# Patient Record
Sex: FEMALE | Race: WHITE | Employment: FULL TIME | URBAN - METROPOLITAN AREA
[De-identification: names, ages, dates, MRNs, and addresses within clinical notes are randomized per-mention and may not be internally consistent; named-entity substitution may affect disease eponyms.]

---

## 2017-04-04 ENCOUNTER — TELEPHONE (OUTPATIENT)
Dept: INTERNAL MEDICINE CLINIC | Facility: CLINIC | Age: 42
End: 2017-04-04

## 2017-04-04 NOTE — TELEPHONE ENCOUNTER
Spoke with pt, no samples available. Advised she can search online for possible coupon from drug company, change medication, or change buproprion from from extended release to original or consider different medication.  Pt does not want to make changes at t

## 2017-04-04 NOTE — TELEPHONE ENCOUNTER
Patient stated that her 's insurance cancelled her for some reason or another. She is wondering if she can get samples from us until they figure out what to do.   She needs BuPROPion HCl ER, XL, 300 MG Oral Tablet 24 Hr and Levonorgest-Eth Estrad 91

## 2017-05-16 RX ORDER — BUPROPION HYDROCHLORIDE 300 MG/1
TABLET ORAL
Qty: 30 TABLET | Refills: 1 | Status: SHIPPED | OUTPATIENT
Start: 2017-05-16 | End: 2017-05-23

## 2017-05-16 NOTE — TELEPHONE ENCOUNTER
Last OV pertinent to medication: 11/11/2016  Last refill date: 11/11/2016     #/refills: 30/5  When pt was asked to return for OV: none noted  Upcoming appt/reason: 5/23/2017 - Med check

## 2017-05-23 ENCOUNTER — OFFICE VISIT (OUTPATIENT)
Dept: INTERNAL MEDICINE CLINIC | Facility: CLINIC | Age: 42
End: 2017-05-23

## 2017-05-23 VITALS
DIASTOLIC BLOOD PRESSURE: 80 MMHG | BODY MASS INDEX: 25.49 KG/M2 | SYSTOLIC BLOOD PRESSURE: 112 MMHG | HEIGHT: 65 IN | WEIGHT: 153 LBS | TEMPERATURE: 98 F | HEART RATE: 85 BPM | RESPIRATION RATE: 16 BRPM | OXYGEN SATURATION: 98 %

## 2017-05-23 DIAGNOSIS — F32.A CONTROLLED DEPRESSION: Primary | ICD-10-CM

## 2017-05-23 PROCEDURE — 99213 OFFICE O/P EST LOW 20 MIN: CPT | Performed by: INTERNAL MEDICINE

## 2017-05-23 RX ORDER — GARLIC EXTRACT 500 MG
1 CAPSULE ORAL DAILY
COMMUNITY

## 2017-05-23 RX ORDER — DOXEPIN HYDROCHLORIDE 50 MG/1
1 CAPSULE ORAL DAILY
COMMUNITY

## 2017-05-23 RX ORDER — BUPROPION HYDROCHLORIDE 300 MG/1
300 TABLET ORAL
Qty: 90 TABLET | Refills: 1 | Status: SHIPPED | OUTPATIENT
Start: 2017-05-23 | End: 2017-12-08

## 2017-05-23 NOTE — PROGRESS NOTES
Keyana River is a 46year old female. To F/U from last visit regarding medication for mood   HPI:    Interim history:her headaches are gone, she lost 14 lb, great diet, feels great  welbutrin working well, never wants to come off.  Sleeping fine     Curren indicates understanding of these issues and agrees to the plan.

## 2017-10-26 ENCOUNTER — TELEPHONE (OUTPATIENT)
Dept: OBGYN CLINIC | Facility: CLINIC | Age: 42
End: 2017-10-26

## 2017-10-26 NOTE — TELEPHONE ENCOUNTER
Pt has requested records to be sent to Dr Michael Colunga; pap,colpo,IUD  Insertion/removal from any years of treatment   [facility] , [address], [place] [address]  Sent to scan October 26,2017

## 2017-12-08 RX ORDER — BUPROPION HYDROCHLORIDE 300 MG/1
300 TABLET ORAL
Qty: 90 TABLET | Refills: 1 | Status: SHIPPED | OUTPATIENT
Start: 2017-12-08 | End: 2018-06-13

## 2017-12-08 NOTE — TELEPHONE ENCOUNTER
Last OV relevant to medication: 5/23/2017  Last refill date: 5/23/2017     #/refills: 90/1  When pt was asked to return for OV: none noted  Upcoming appt/reason: none

## 2018-06-14 RX ORDER — BUPROPION HYDROCHLORIDE 300 MG/1
300 TABLET ORAL
Qty: 90 TABLET | Refills: 0 | Status: SHIPPED | OUTPATIENT
Start: 2018-06-14 | End: 2018-07-26

## 2018-06-14 NOTE — TELEPHONE ENCOUNTER
Patient called back regarding Medication follow up appointment. Patient scheduled med check appointment for 07-26-18. Patient wanted to verify if she is able to get a refill on her prescription before her appointment. Please advise. Thank you.

## 2018-06-14 NOTE — TELEPHONE ENCOUNTER
Called patient, Left Message and verbalized that a Medication Follow Up Appointment is needed in the system first before we can Fully Refill the prescription requested, and that a Temporary Refill can be sent to their local pharmacy if patient will run out

## 2018-07-26 ENCOUNTER — OFFICE VISIT (OUTPATIENT)
Dept: INTERNAL MEDICINE CLINIC | Facility: CLINIC | Age: 43
End: 2018-07-26
Payer: COMMERCIAL

## 2018-07-26 VITALS
HEART RATE: 71 BPM | WEIGHT: 154 LBS | TEMPERATURE: 98 F | OXYGEN SATURATION: 99 % | SYSTOLIC BLOOD PRESSURE: 108 MMHG | DIASTOLIC BLOOD PRESSURE: 72 MMHG | RESPIRATION RATE: 14 BRPM | HEIGHT: 65 IN | BODY MASS INDEX: 25.66 KG/M2

## 2018-07-26 DIAGNOSIS — F32.A CONTROLLED DEPRESSION: Primary | ICD-10-CM

## 2018-07-26 DIAGNOSIS — E78.00 HIGH CHOLESTEROL: ICD-10-CM

## 2018-07-26 PROCEDURE — 99213 OFFICE O/P EST LOW 20 MIN: CPT | Performed by: INTERNAL MEDICINE

## 2018-07-26 RX ORDER — BUPROPION HYDROCHLORIDE 300 MG/1
300 TABLET ORAL
Qty: 90 TABLET | Refills: 1 | Status: SHIPPED | OUTPATIENT
Start: 2018-07-26 | End: 2019-02-28

## 2018-07-26 NOTE — PROGRESS NOTES
Daxa Zaidi is a 38year old female.  To F/U from last visit regarding bupropion   HPI:    Interim history:she is doing well on medications  She is sleeping oK, takes benadryl now and then  She does some exercisek, walks 5 miles 3 days weekly  Appetite no Metabolic Panel (8)    Meds & Refills for this Visit:  Signed Prescriptions Disp Refills    BuPROPion HCl ER, XL, 300 MG Oral Tablet 24 Hr 90 tablet 1      Sig: Take 1 tablet (300 mg total) by mouth once daily.            Imaging & Consults:  None    No Fol

## 2018-10-15 ENCOUNTER — TELEPHONE (OUTPATIENT)
Dept: INTERNAL MEDICINE CLINIC | Facility: CLINIC | Age: 43
End: 2018-10-15

## 2018-10-15 RX ORDER — ESCITALOPRAM OXALATE 10 MG/1
10 TABLET ORAL DAILY
Qty: 30 TABLET | Refills: 0 | Status: CANCELLED | OUTPATIENT
Start: 2018-10-15

## 2018-10-15 NOTE — TELEPHONE ENCOUNTER
Patient calling in c/o of increase in anxiety and depression, feels her medication may need to be adjusted. Patient has see Dr. Yanick Altamirano for these issues multiple times before.  Patient states she has some increased stressors in her life (full time job, kids,

## 2018-10-15 NOTE — TELEPHONE ENCOUNTER
Pt would like appt with Dr Yanick Altamirano for depression and anxiety, would like to be seen as soon as possible. I am not sure if this should be a 40 min appt, Dr Yanick Altamirano does not have 40 min avail until Nov 2.  Pt  would prefer to See Dr Yanick Altamirano rather than any other p

## 2018-10-15 NOTE — TELEPHONE ENCOUNTER
Spoke to patient. Patient would like to wait to make any changes or be referred anywhere until speaking with Dr. Andre Taveras on Thursday. She is open to both suggestions though. Routed FYI to Dr. Andre Taveras. OK to close.

## 2018-10-15 NOTE — TELEPHONE ENCOUNTER
Resume lexapro 10 mg daily in the meantime  #30  And [facility] as well thanks

## 2018-10-18 PROBLEM — F41.9 ANXIETY AND DEPRESSION: Status: ACTIVE | Noted: 2018-10-18

## 2018-10-18 PROBLEM — F32.A ANXIETY AND DEPRESSION: Status: ACTIVE | Noted: 2018-10-18

## 2018-10-18 NOTE — PROGRESS NOTES
Mckenna Arias is a 38year old female  Patient presents with:   Anxiety  Depression      HPI:   Pt called a couple of days ago and related the following:  Patient calling in c/o of increase in anxiety and depression, feels her medication may need to be adju mouth daily. Disp: 1 Package Rfl: 4   multivitamin Oral Tab Take 1 tablet by mouth daily. Disp:  Rfl:    Acidophilus/Pectin Oral Cap Take 1 capsule by mouth daily.  Disp:  Rfl:    ibuprofen (MOTRIN) 600 MG Oral Tab Take 600 mg by mouth every 6 (six) hours a

## 2018-11-15 RX ORDER — ESCITALOPRAM OXALATE 10 MG/1
TABLET ORAL
Qty: 30 TABLET | Refills: 0 | Status: SHIPPED | OUTPATIENT
Start: 2018-11-15 | End: 2018-11-27

## 2018-11-15 NOTE — TELEPHONE ENCOUNTER
Last OV relevant to medication: 10/18/18  Last refill date: 10/18/18     #/refills: 30/0  When pt was asked to return for OV: 4 wks  Upcoming appt/reason: 11/27/18 anxiety f/u

## 2018-11-27 NOTE — PROGRESS NOTES
Carolyn Abdi is a 38year old female.  To F/U from last visit regarding anxiety and depression  HPI:    Interim history:taking ativan every evening for nerves, it helps her relax a lot in the evening  She feels better, less crying, less annoyed and irritab depression  (primary encounter diagnosis) and lots of stress, CPM, she is using ativan nightly now, cautioned against accelerated use and abuse, 1 daily is fine    No orders of the defined types were placed in this encounter.       Meds & Refills for this V

## 2018-12-17 RX ORDER — ESCITALOPRAM OXALATE 10 MG/1
TABLET ORAL
Qty: 30 TABLET | Refills: 0 | OUTPATIENT
Start: 2018-12-17

## 2019-02-28 ENCOUNTER — TELEPHONE (OUTPATIENT)
Dept: INTERNAL MEDICINE CLINIC | Facility: CLINIC | Age: 44
End: 2019-02-28

## 2019-02-28 RX ORDER — BUPROPION HYDROCHLORIDE 450 MG/1
1 TABLET, FILM COATED, EXTENDED RELEASE ORAL DAILY
Qty: 90 TABLET | Refills: 2 | OUTPATIENT
Start: 2019-02-28 | End: 2019-04-18

## 2019-02-28 NOTE — TELEPHONE ENCOUNTER
RX sent with sig 1 mg by mouth daily. Verified that it's one tablet by mouth daily. Med list updated. Also d/c'd 300 mg tablet.

## 2019-02-28 NOTE — TELEPHONE ENCOUNTER
[facility]/PHARMACY #[id number] Myke Garibay AT [facility], [address], [phone] following up regarding medication sent in today: Bupropion requesting clarification on directions. Nurse line busy. Please advise. Thank you!

## 2019-02-28 NOTE — PROGRESS NOTES
Keyana River is a 41year old female. To F/U from last visit regarding mood  HPI:    Interim history:she gained 14 lb since lexapro and is very sluggish and tired.  She has lost sexual function as well  Her mood is so much better though    Current Outpatie necessary  Increase welbutrin to 450 xl, no hx of sx's, f/u 3 mos  She still sees her therapist    No orders of the defined types were placed in this encounter.       Meds & Refills for this Visit:  Requested Prescriptions     Signed Prescriptions Disp Refi

## 2019-03-06 PROCEDURE — 87624 HPV HI-RISK TYP POOLED RSLT: CPT | Performed by: OBSTETRICS & GYNECOLOGY

## 2019-03-06 PROCEDURE — 88175 CYTOPATH C/V AUTO FLUID REDO: CPT | Performed by: OBSTETRICS & GYNECOLOGY

## 2019-03-15 RX ORDER — ESCITALOPRAM OXALATE 10 MG/1
TABLET ORAL
Qty: 90 TABLET | Refills: 0 | OUTPATIENT
Start: 2019-03-15

## 2019-04-08 RX ORDER — BUPROPION HYDROCHLORIDE 300 MG/1
TABLET ORAL
Qty: 90 TABLET | Refills: 1 | OUTPATIENT
Start: 2019-04-08

## 2019-04-15 ENCOUNTER — TELEPHONE (OUTPATIENT)
Dept: INTERNAL MEDICINE CLINIC | Facility: CLINIC | Age: 44
End: 2019-04-15

## 2019-04-15 NOTE — TELEPHONE ENCOUNTER
Patient calling to state that the change to Wellbutrin 450 mg is too much for her. Patients Wellbutrin increased from 300mg to 450mg 2/28/19, at that time she also d/c Lexapro per Dr. Mitzy Michel recommendation.  Patient states that she \"can't get happy\", she

## 2019-04-15 NOTE — TELEPHONE ENCOUNTER
Patient calling regarding Wellbutrin medication she is taking stated she does not like the side effects and would like to switch medications. Pt requesting a call back today. Please advise. Thank you!

## 2019-04-18 ENCOUNTER — OFFICE VISIT (OUTPATIENT)
Dept: INTERNAL MEDICINE CLINIC | Facility: CLINIC | Age: 44
End: 2019-04-18
Payer: COMMERCIAL

## 2019-04-18 VITALS
HEIGHT: 65 IN | RESPIRATION RATE: 14 BRPM | DIASTOLIC BLOOD PRESSURE: 66 MMHG | WEIGHT: 163 LBS | TEMPERATURE: 98 F | HEART RATE: 71 BPM | SYSTOLIC BLOOD PRESSURE: 118 MMHG | BODY MASS INDEX: 27.16 KG/M2 | OXYGEN SATURATION: 98 %

## 2019-04-18 DIAGNOSIS — F32.A ANXIETY AND DEPRESSION: ICD-10-CM

## 2019-04-18 DIAGNOSIS — F41.9 ANXIETY AND DEPRESSION: ICD-10-CM

## 2019-04-18 DIAGNOSIS — T43.205A ADVERSE EFFECT OF ANTIDEPRESSANT DRUG, INITIAL ENCOUNTER: Primary | ICD-10-CM

## 2019-04-18 PROCEDURE — 99213 OFFICE O/P EST LOW 20 MIN: CPT | Performed by: INTERNAL MEDICINE

## 2019-04-18 RX ORDER — CLONAZEPAM 0.5 MG/1
0.5 TABLET ORAL 2 TIMES DAILY PRN
Qty: 30 TABLET | Refills: 0 | Status: SHIPPED | OUTPATIENT
Start: 2019-04-18 | End: 2019-05-03

## 2019-04-18 RX ORDER — BUPROPION HYDROCHLORIDE 300 MG/1
300 TABLET ORAL
Qty: 30 TABLET | Refills: 1 | Status: SHIPPED | OUTPATIENT
Start: 2019-04-18

## 2019-04-18 NOTE — PROGRESS NOTES
Coral Burnett is a 41year old female. To F/U from last visit regarding depression/anxiety  HPI:     Interim history:she is absolutely miserable w/anxiety and irritability, on edge, on 450 mg welbutrin.  We had increased and d/c'd lexapro  Due to loss of se PLAN:   Adverse effect of antidepressant drug, initial encounter  (primary encounter diagnosis) lower welbutrin back down to 300 XL daily, she has tolerated well before, albeit in conjunction w/SSRI.  I suspect she may still need such a drug but we can see

## 2019-05-03 NOTE — PROGRESS NOTES
Daxa Zaidi is a 41year old female.  To F/U from last visit regarding anxiety  HPI:    Interim history:we had stopped lexapro and increased welbutrin and became terribly dysphoric and anxious, 2 weeks ago we lowered her welbutrin back down to the 300 tayler diagnosis) she is using benzos now and still doesn't feel well  She had done well on this dose of welbutrin in the past but is not feeling herself.  She has had enormous stress-  w//rectal ca, son w/depression- will CPM and refer to [facility]    No order

## 2019-05-06 ENCOUNTER — TELEPHONE (OUTPATIENT)
Dept: INTERNAL MEDICINE CLINIC | Facility: CLINIC | Age: 44
End: 2019-05-06

## 2019-05-06 NOTE — TELEPHONE ENCOUNTER
Hi Dr. Rocio Soni,     I was able to reach and speak with Mary Siegel this morning. I think she would definitely benefit from both outpatient therapy and psychiatry, she was only agreeable to psychiatry for medication management.  Below are the resources I ema

## 2019-05-29 ENCOUNTER — TELEPHONE (OUTPATIENT)
Dept: INTERNAL MEDICINE CLINIC | Facility: CLINIC | Age: 44
End: 2019-05-29

## 2019-05-29 NOTE — TELEPHONE ENCOUNTER
Patient is now seeing psych, does she need to reschedule appointment for 3 month follow up for anxiety/depression? Please advise. Thank you!

## 2019-05-29 NOTE — TELEPHONE ENCOUNTER
Patient called, she had a 3 month follow up with DR. Alba Garcia on 6/3/2019. The patient could not make that day work so she had to cancel the apt, but also was questioning if she really needed the appointment or not.  Patient stated that she is now having a spe

## 2019-07-02 RX ORDER — BUSPIRONE HYDROCHLORIDE 10 MG/1
TABLET ORAL
Qty: 1 TABLET | Refills: 0 | COMMUNITY
Start: 2019-07-02 | End: 2020-07-21 | Stop reason: ALTCHOICE

## 2019-07-02 RX ORDER — BUPROPION HYDROCHLORIDE 300 MG/1
TABLET ORAL
Qty: 90 TABLET | Refills: 0 | OUTPATIENT
Start: 2019-07-02

## 2019-07-02 NOTE — TELEPHONE ENCOUNTER
Please call pt  This is probably on automatic refill  Ask her if she has seen psych and how she is doing, what she is taking, thanks

## 2019-07-02 NOTE — TELEPHONE ENCOUNTER
Spoke with pt. States she is doing really well! Seeing therapist and psychiatrist. Will be assigned new psychiatrist soon as current one leaving.  Taking same dose buproprion, weaning off clonazopam, and buspar added (med list reconciled.) Refill denied sin

## 2019-07-02 NOTE — TELEPHONE ENCOUNTER
Last OV relevant to medication: 5/3/19  Last refill date: 4/18/19     #/refills: 30/1  When pt was asked to return for OV: to see LO, cannot see those notes, CPM noted as well  Upcoming appt/reason: none

## 2020-04-16 ENCOUNTER — TELEPHONE (OUTPATIENT)
Dept: INTERNAL MEDICINE CLINIC | Facility: HOSPITAL | Age: 45
End: 2020-04-16

## 2020-04-16 ENCOUNTER — LAB ENCOUNTER (OUTPATIENT)
Dept: LAB | Facility: HOSPITAL | Age: 45
End: 2020-04-16
Attending: PREVENTIVE MEDICINE
Payer: COMMERCIAL

## 2020-04-16 ENCOUNTER — TELEPHONE (OUTPATIENT)
Dept: INTERNAL MEDICINE CLINIC | Facility: CLINIC | Age: 45
End: 2020-04-16

## 2020-04-16 DIAGNOSIS — Z20.822 SUSPECTED COVID-19 VIRUS INFECTION: Primary | ICD-10-CM

## 2020-04-16 NOTE — TELEPHONE ENCOUNTER
Patient called to get an update and possibly do a telephone visit. She already got a call back from [facility] and got her test done already. She is waiting for results.  She was instructed if she needs to do a telephone visit for her symptoms at any p

## 2020-04-16 NOTE — TELEPHONE ENCOUNTER
Pt works at [employer] and Pt did call sick line at [employer] and waiting for a call back. Pt was in contact with someone in hospital with COVID, Pt wanting to get tested.   Pt was wondering who orders the COVID test, if she should wait to hear

## 2020-04-17 NOTE — IMMEDIATE CARE/WORKERS COMP PHYSICIAN REPORT
Called employee (verified by 2 identifiers) to give NEGATIVE results of COVID test that were reviewed by Dr. Chela Mace.     Per [facility] negative COVID guidelines:    · Employee instructed to continue to self-monitor symptoms and RTW when fever free for 24

## 2020-06-25 DIAGNOSIS — Z78.9 PARTICIPANT IN HEALTH AND WELLNESS PLAN: Primary | ICD-10-CM

## 2020-08-11 ENCOUNTER — TELEPHONE (OUTPATIENT)
Dept: INTERNAL MEDICINE CLINIC | Facility: HOSPITAL | Age: 45
End: 2020-08-11

## 2020-08-11 ENCOUNTER — LAB ENCOUNTER (OUTPATIENT)
Dept: LAB | Facility: HOSPITAL | Age: 45
End: 2020-08-11
Attending: PREVENTIVE MEDICINE
Payer: COMMERCIAL

## 2020-08-11 DIAGNOSIS — U07.1 COVID-19: Primary | ICD-10-CM

## 2020-08-11 DIAGNOSIS — U07.1 COVID-19: ICD-10-CM

## 2020-08-11 LAB — SARS-COV-2 RNA RESP QL NAA+PROBE: NOT DETECTED

## 2020-10-15 ENCOUNTER — HOSPITAL ENCOUNTER (OUTPATIENT)
Dept: MAMMOGRAPHY | Age: 45
Discharge: HOME | End: 2020-10-15
Attending: OBSTETRICS & GYNECOLOGY
Payer: COMMERCIAL

## 2020-10-15 DIAGNOSIS — Z01.419 ENCOUNTER FOR GYNECOLOGICAL EXAMINATION WITHOUT ABNORMAL FINDING: ICD-10-CM

## 2020-10-15 DIAGNOSIS — Z12.31 ENCOUNTER FOR SCREENING MAMMOGRAM FOR MALIGNANT NEOPLASM OF BREAST: ICD-10-CM

## 2020-10-15 PROCEDURE — 77067 SCR MAMMO BI INCL CAD: CPT | Performed by: OBSTETRICS & GYNECOLOGY

## 2020-10-15 PROCEDURE — 77063 BREAST TOMOSYNTHESIS BI: CPT | Performed by: OBSTETRICS & GYNECOLOGY

## 2020-11-09 ENCOUNTER — TELEPHONE (OUTPATIENT)
Dept: INTERNAL MEDICINE CLINIC | Facility: HOSPITAL | Age: 45
End: 2020-11-09

## 2020-11-09 DIAGNOSIS — Z20.822 SUSPECTED COVID-19 VIRUS INFECTION: Primary | ICD-10-CM

## 2020-11-10 ENCOUNTER — APPOINTMENT (OUTPATIENT)
Dept: LAB | Age: 45
End: 2020-11-10
Attending: PREVENTIVE MEDICINE

## 2020-11-10 DIAGNOSIS — Z20.822 SUSPECTED COVID-19 VIRUS INFECTION: ICD-10-CM

## 2020-11-12 ENCOUNTER — TELEPHONE (OUTPATIENT)
Dept: INTERNAL MEDICINE CLINIC | Facility: HOSPITAL | Age: 45
End: 2020-11-12

## 2020-11-12 DIAGNOSIS — Z20.822 SUSPECTED 2019 NOVEL CORONAVIRUS INFECTION: Primary | ICD-10-CM

## 2020-11-13 ENCOUNTER — LAB ENCOUNTER (OUTPATIENT)
Dept: LAB | Age: 45
End: 2020-11-13
Attending: PREVENTIVE MEDICINE

## 2020-11-13 DIAGNOSIS — Z20.822 SUSPECTED 2019 NOVEL CORONAVIRUS INFECTION: ICD-10-CM

## 2020-12-19 ENCOUNTER — IMMUNIZATION (OUTPATIENT)
Dept: LAB | Facility: HOSPITAL | Age: 45
End: 2020-12-19
Attending: PREVENTIVE MEDICINE

## 2020-12-19 DIAGNOSIS — Z23 NEED FOR VACCINATION: ICD-10-CM

## 2020-12-19 PROCEDURE — 0001A PFIZER-BIONTECH COVID-19 VACCINE: CPT

## 2020-12-30 ENCOUNTER — NURSE ONLY (OUTPATIENT)
Dept: LAB | Facility: HOSPITAL | Age: 45
End: 2020-12-30
Attending: PREVENTIVE MEDICINE

## 2020-12-30 ENCOUNTER — TELEPHONE (OUTPATIENT)
Dept: INTERNAL MEDICINE CLINIC | Facility: HOSPITAL | Age: 45
End: 2020-12-30

## 2020-12-30 DIAGNOSIS — Z20.822 SUSPECTED COVID-19 VIRUS INFECTION: Primary | ICD-10-CM

## 2020-12-30 DIAGNOSIS — Z20.822 SUSPECTED COVID-19 VIRUS INFECTION: ICD-10-CM

## 2020-12-30 LAB — SARS-COV-2 AG RESP QL IA.RAPID: NOT DETECTED

## 2020-12-30 PROCEDURE — 87426 SARSCOV CORONAVIRUS AG IA: CPT

## 2020-12-30 NOTE — TELEPHONE ENCOUNTER
Results and RTW guidelines:    COVID RESULT GIVEN:      Test type:    [x] Rapid         []       [x] NEGATIVE     Ordered  retest?  []Yes   [x] No (skip to RTW)      Notes:  Took tylenol approx 5 hrs ago; temp now 99.7.    Denies any sx at [facility]

## 2020-12-30 NOTE — TELEPHONE ENCOUNTER
Department: Labor and Delivery                                 [x] [facility]  []CHARLENE   [] [address] [place] [address]    Dept Manager/Supervisor/team or clinical lead:   Althea Bright    Position:  [] MD     [x] RN     [] Respiratory Therapist     [] PCT     [ If yes, who:   Do you share a workspace? Yes []   No [x]       If yes, with whom? Do you have any family members sick at home?      [] Yes    [x] No   If yes, explain:       NOTES: (narrative documentation)  Shahab Simms was at work this morning receiving her susy

## 2021-01-09 ENCOUNTER — IMMUNIZATION (OUTPATIENT)
Dept: LAB | Facility: HOSPITAL | Age: 46
End: 2021-01-09
Attending: PREVENTIVE MEDICINE

## 2021-01-09 DIAGNOSIS — Z23 NEED FOR VACCINATION: ICD-10-CM

## 2021-01-09 PROCEDURE — 0002A SARSCOV2 VAC 30MCG/0.3ML IM: CPT

## 2022-01-22 ENCOUNTER — NURSE ONLY (OUTPATIENT)
Dept: LAB | Facility: HOSPITAL | Age: 47
End: 2022-01-22
Attending: PREVENTIVE MEDICINE
Payer: COMMERCIAL

## 2022-01-22 ENCOUNTER — TELEPHONE (OUTPATIENT)
Dept: INTERNAL MEDICINE CLINIC | Facility: HOSPITAL | Age: 47
End: 2022-01-22

## 2022-01-22 DIAGNOSIS — Z20.822 SUSPECTED COVID-19 VIRUS INFECTION: Primary | ICD-10-CM

## 2022-01-22 DIAGNOSIS — Z20.822 SUSPECTED COVID-19 VIRUS INFECTION: ICD-10-CM

## 2022-01-22 LAB — SARS-COV-2 RNA RESP QL NAA+PROBE: NOT DETECTED

## 2022-01-22 NOTE — TELEPHONE ENCOUNTER
Results and RTW guidelines:    COVID RESULT:    [x] Viewed by employee in [address] RTW plan and instructions as indicated on triage call. Manager notified. Estimated RTW date: 1/23/2022  [x] Discussed with employee   [] Unable to reach by phone.   Sent Plan as noted above  []  Length of time to obtain results   []  Quarantine instructions  []  Masking protocol   []  S/S of worsening infection/condition and importance of prompt medical re-evaluation including when to seek emergency care  [] If symptoms de

## 2022-01-22 NOTE — TELEPHONE ENCOUNTER
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 2/22/2022    Did you have close contact with someone on your unit while not wearing a mask? (e.g., during meal breaks):  Yes []   No [x]    If yes, who:   Do you share a workspace? Yes []   No [x]       If yes, with whom? mildly symptomatic (with improving symptoms) may return to work day 6  []      On day 5, if symptomatic, call Employee Health for RTW screening        [x]  Plan noted above  []  Length of time to obtain results  [x]  Quarantine instructions  [x]  S/S of wo

## 2022-01-23 NOTE — TELEPHONE ENCOUNTER
Rapid COVID test was Negative on 1/22/2022. Alinity test was ordered yesterday and not collected to date.

## 2022-01-24 ENCOUNTER — NURSE ONLY (OUTPATIENT)
Dept: LAB | Age: 47
End: 2022-01-24
Attending: PREVENTIVE MEDICINE
Payer: COMMERCIAL

## 2022-01-24 DIAGNOSIS — Z20.822 SUSPECTED COVID-19 VIRUS INFECTION: ICD-10-CM

## 2022-01-26 LAB — SARS-COV-2 RNA RESP QL NAA+PROBE: NOT DETECTED

## 2022-01-27 NOTE — TELEPHONE ENCOUNTER
Results and RTW guidelines:    COVID RESULT:    [x] Viewed by employee in [address] RTW plan and instructions as indicated on triage call. Manager notified. Estimated RTW date:   [] Discussed with employee   [] Unable to reach by phone.   Sent via [employer] Seek emergent care with worsening symptoms   - If employee is still experiencing severe symptoms on day 5 must make a RTW appt with Employee Health, Employee will not be cleared if:    1.  Has consistent cough, shortness of breath or fatigue that restricts Notified

## 2022-01-27 NOTE — TELEPHONE ENCOUNTER
Results and RTW guidelines:    COVID RESULT:    [x] Viewed by employee in [facility]. RTW plan and instructions as indicated on triage call. Manager notified. Estimated RTW date:   [x] Discussed with employee   [] Unable to reach by phone.   Sent via [facility] Seek emergent care with worsening symptoms   - If employee is still experiencing severe symptoms on day 5 must make a RTW appt with Employee Health, Employee will not be cleared if:    1.  Has consistent cough, shortness of breath or fatigue that restricts plan/instructions  [x] Manager Notified

## 2022-01-28 ENCOUNTER — TELEPHONE (OUTPATIENT)
Dept: INTERNAL MEDICINE CLINIC | Facility: CLINIC | Age: 47
End: 2022-01-28

## 2022-01-28 NOTE — TELEPHONE ENCOUNTER
Virtual/Telephone Consent    Alfred Jacob verbally {consents to  a Virtual/Telephone Check-In service on 1/29/22  Patient understands and accepts financial responsibility for any deductible, co-insurance and/or co-pays associated with this service.     Pt ok with VV on 1/29 at 11:40 cell#[phone]

## 2022-01-29 ENCOUNTER — TELEMEDICINE (OUTPATIENT)
Dept: INTERNAL MEDICINE CLINIC | Facility: CLINIC | Age: 47
End: 2022-01-29
Payer: COMMERCIAL

## 2022-01-29 DIAGNOSIS — R05.9 COUGH: ICD-10-CM

## 2022-01-29 DIAGNOSIS — F41.9 ANXIETY AND DEPRESSION: ICD-10-CM

## 2022-01-29 DIAGNOSIS — Z12.31 ENCOUNTER FOR SCREENING MAMMOGRAM FOR BREAST CANCER: ICD-10-CM

## 2022-01-29 DIAGNOSIS — F32.A ANXIETY AND DEPRESSION: ICD-10-CM

## 2022-01-29 DIAGNOSIS — R53.83 FATIGUE, UNSPECIFIED TYPE: Primary | ICD-10-CM

## 2022-01-29 PROCEDURE — 99214 OFFICE O/P EST MOD 30 MIN: CPT | Performed by: INTERNAL MEDICINE

## 2022-01-29 RX ORDER — DULOXETIN HYDROCHLORIDE 30 MG/1
30 CAPSULE, DELAYED RELEASE ORAL DAILY
COMMUNITY
Start: 2021-12-14 | End: 2022-01-29 | Stop reason: ALTCHOICE

## 2022-01-29 RX ORDER — DULOXETIN HYDROCHLORIDE 30 MG/1
30 CAPSULE, DELAYED RELEASE ORAL 2 TIMES DAILY
Refills: 0 | COMMUNITY
Start: 2022-01-29

## 2022-01-29 RX ORDER — HYDROXYZINE 50 MG/1
100 TABLET, FILM COATED ORAL NIGHTLY PRN
COMMUNITY
Start: 2022-01-17

## 2022-01-29 RX ORDER — CLONAZEPAM 1 MG/1
1 TABLET ORAL NIGHTLY PRN
Refills: 0 | COMMUNITY
Start: 2022-01-29

## 2022-01-29 NOTE — PROGRESS NOTES
This visit is conducted using Telemedicine with live, interactive video and audio. Patient has been referred to the [facility] website at [email or web address] to review the yearly Consent to Treat document.     Patient understands and accepts financial res Smokeless tobacco: Never Used    Vaping Use      Vaping Use: Never used    Alcohol use:  Yes      Alcohol/week: 0.0 - 1.0 standard drinks      Comment: socially    Drug use: No     Patient Active Problem List:     Hyperlipidemia     Controlled depression

## 2022-02-09 ENCOUNTER — HOSPITAL ENCOUNTER (OUTPATIENT)
Dept: GENERAL RADIOLOGY | Facility: HOSPITAL | Age: 47
Discharge: HOME | End: 2022-02-09
Attending: INTERNAL MEDICINE
Payer: COMMERCIAL

## 2022-02-09 DIAGNOSIS — R53.83 FATIGUE, UNSPECIFIED TYPE: ICD-10-CM

## 2022-02-09 DIAGNOSIS — R05.9 COUGH: ICD-10-CM

## 2022-02-09 PROCEDURE — 71046 X-RAY EXAM CHEST 2 VIEWS: CPT | Performed by: INTERNAL MEDICINE

## 2022-02-16 ENCOUNTER — OFFICE VISIT (OUTPATIENT)
Dept: FAMILY MEDICINE CLINIC | Facility: CLINIC | Age: 47
End: 2022-02-16
Payer: COMMERCIAL

## 2022-02-16 ENCOUNTER — PATIENT MESSAGE (OUTPATIENT)
Dept: INTERNAL MEDICINE CLINIC | Facility: CLINIC | Age: 47
End: 2022-02-16

## 2022-02-16 VITALS
HEIGHT: 65 IN | DIASTOLIC BLOOD PRESSURE: 87 MMHG | TEMPERATURE: 98 F | BODY MASS INDEX: 26.66 KG/M2 | RESPIRATION RATE: 18 BRPM | WEIGHT: 160 LBS | SYSTOLIC BLOOD PRESSURE: 131 MMHG | HEART RATE: 94 BPM | OXYGEN SATURATION: 98 %

## 2022-02-16 DIAGNOSIS — J01.00 ACUTE NON-RECURRENT MAXILLARY SINUSITIS: Primary | ICD-10-CM

## 2022-02-16 DIAGNOSIS — J98.01 BRONCHOSPASM: ICD-10-CM

## 2022-02-16 PROCEDURE — 99213 OFFICE O/P EST LOW 20 MIN: CPT | Performed by: NURSE PRACTITIONER

## 2022-02-16 PROCEDURE — 3079F DIAST BP 80-89 MM HG: CPT | Performed by: NURSE PRACTITIONER

## 2022-02-16 PROCEDURE — 3008F BODY MASS INDEX DOCD: CPT | Performed by: NURSE PRACTITIONER

## 2022-02-16 PROCEDURE — 3075F SYST BP GE 130 - 139MM HG: CPT | Performed by: NURSE PRACTITIONER

## 2022-02-16 RX ORDER — AMOXICILLIN AND CLAVULANATE POTASSIUM 875; 125 MG/1; MG/1
1 TABLET, FILM COATED ORAL 2 TIMES DAILY
Qty: 20 TABLET | Refills: 0 | Status: SHIPPED | OUTPATIENT
Start: 2022-02-16 | End: 2022-02-26

## 2022-02-16 RX ORDER — BENZONATATE 200 MG/1
200 CAPSULE ORAL 3 TIMES DAILY PRN
Qty: 20 CAPSULE | Refills: 0 | Status: SHIPPED | OUTPATIENT
Start: 2022-02-16 | End: 2022-02-23

## 2022-02-16 RX ORDER — PREDNISONE 20 MG/1
20 TABLET ORAL 2 TIMES DAILY
Qty: 10 TABLET | Refills: 0 | Status: SHIPPED | OUTPATIENT
Start: 2022-02-16 | End: 2022-02-21

## 2022-02-16 RX ORDER — FLUTICASONE PROPIONATE 50 MCG
2 SPRAY, SUSPENSION (ML) NASAL DAILY
Qty: 1 EACH | Refills: 0 | Status: SHIPPED | OUTPATIENT
Start: 2022-02-16

## 2022-02-17 NOTE — TELEPHONE ENCOUNTER
Okay to wait until a week after ending prednisone since it can elevated wbcs and she just started it yesterday. Thanks.

## 2022-03-10 DIAGNOSIS — J01.00 ACUTE NON-RECURRENT MAXILLARY SINUSITIS: ICD-10-CM

## 2022-03-10 RX ORDER — FLUTICASONE PROPIONATE 50 MCG
SPRAY, SUSPENSION (ML) NASAL
Qty: 16 ML | Refills: 0 | OUTPATIENT
Start: 2022-03-10

## 2022-03-10 NOTE — TELEPHONE ENCOUNTER
Per protocol: no cosign required but prescribed at [facility] on 2/16/2022.      Routed to PCP for approval.

## 2022-05-10 ENCOUNTER — PATIENT MESSAGE (OUTPATIENT)
Dept: INTERNAL MEDICINE CLINIC | Facility: CLINIC | Age: 47
End: 2022-05-10

## 2022-05-24 ENCOUNTER — OFFICE VISIT (OUTPATIENT)
Dept: FAMILY MEDICINE CLINIC | Facility: CLINIC | Age: 47
End: 2022-05-24
Payer: COMMERCIAL

## 2022-05-24 VITALS
BODY MASS INDEX: 27.49 KG/M2 | HEART RATE: 95 BPM | RESPIRATION RATE: 18 BRPM | TEMPERATURE: 97 F | WEIGHT: 165 LBS | OXYGEN SATURATION: 99 % | DIASTOLIC BLOOD PRESSURE: 76 MMHG | HEIGHT: 65 IN | SYSTOLIC BLOOD PRESSURE: 135 MMHG

## 2022-05-24 DIAGNOSIS — J02.9 SORE THROAT: Primary | ICD-10-CM

## 2022-05-24 DIAGNOSIS — J06.9 VIRAL UPPER RESPIRATORY ILLNESS: ICD-10-CM

## 2022-05-24 LAB
CONTROL LINE PRESENT WITH A CLEAR BACKGROUND (YES/NO): YES YES/NO
STREP GRP A CUL-SCR: NEGATIVE

## 2022-05-24 PROCEDURE — 3008F BODY MASS INDEX DOCD: CPT | Performed by: NURSE PRACTITIONER

## 2022-05-24 PROCEDURE — 87880 STREP A ASSAY W/OPTIC: CPT | Performed by: NURSE PRACTITIONER

## 2022-05-24 PROCEDURE — 99213 OFFICE O/P EST LOW 20 MIN: CPT | Performed by: NURSE PRACTITIONER

## 2022-05-24 PROCEDURE — 3078F DIAST BP <80 MM HG: CPT | Performed by: NURSE PRACTITIONER

## 2022-05-24 PROCEDURE — 3075F SYST BP GE 130 - 139MM HG: CPT | Performed by: NURSE PRACTITIONER

## 2022-05-25 LAB — SARS-COV-2 RNA RESP QL NAA+PROBE: NOT DETECTED

## 2022-06-17 ENCOUNTER — TELEPHONE (OUTPATIENT)
Dept: INTERNAL MEDICINE CLINIC | Facility: CLINIC | Age: 47
End: 2022-06-17

## 2022-06-23 ENCOUNTER — MED REC SCAN ONLY (OUTPATIENT)
Dept: INTERNAL MEDICINE CLINIC | Facility: CLINIC | Age: 47
End: 2022-06-23

## 2022-08-01 ENCOUNTER — HOSPITAL ENCOUNTER (OUTPATIENT)
Dept: MAMMOGRAPHY | Age: 47
Discharge: HOME | End: 2022-08-01
Attending: INTERNAL MEDICINE
Payer: COMMERCIAL

## 2022-08-01 DIAGNOSIS — Z12.31 ENCOUNTER FOR SCREENING MAMMOGRAM FOR BREAST CANCER: ICD-10-CM

## 2022-08-01 PROCEDURE — 77067 SCR MAMMO BI INCL CAD: CPT | Performed by: INTERNAL MEDICINE

## 2022-08-01 PROCEDURE — 77063 BREAST TOMOSYNTHESIS BI: CPT | Performed by: INTERNAL MEDICINE

## 2022-11-04 ENCOUNTER — IMMUNIZATION (OUTPATIENT)
Dept: LAB | Facility: HOSPITAL | Age: 47
End: 2022-11-04
Attending: PREVENTIVE MEDICINE
Payer: COMMERCIAL

## 2022-11-04 DIAGNOSIS — Z23 NEED FOR VACCINATION: Primary | ICD-10-CM

## 2022-11-04 PROCEDURE — 90471 IMMUNIZATION ADMIN: CPT

## 2022-12-07 NOTE — TELEPHONE ENCOUNTER
Labs re-ordered to [facility] per pt request.  Pt seen at [facility] yesterday, states cough worsened. Wants to know if Dr Carmen Gutierres wants her to still do labs now or wait since she is feeling worse. Noted pt was eval by Dr Carmen Gutierres 1/29/22 with cough and labs were ordered at that time. Graft Cartilage Fenestration Text: The cartilage was fenestrated with a 2mm punch biopsy to help facilitate graft survival and healing.

## 2023-02-21 ENCOUNTER — OFFICE VISIT (OUTPATIENT)
Dept: INTERNAL MEDICINE CLINIC | Facility: CLINIC | Age: 48
End: 2023-02-21
Payer: COMMERCIAL

## 2023-02-21 ENCOUNTER — LAB ENCOUNTER (OUTPATIENT)
Dept: LAB | Age: 48
End: 2023-02-21
Attending: INTERNAL MEDICINE
Payer: COMMERCIAL

## 2023-02-21 VITALS
RESPIRATION RATE: 12 BRPM | BODY MASS INDEX: 29 KG/M2 | TEMPERATURE: 98 F | WEIGHT: 176.19 LBS | DIASTOLIC BLOOD PRESSURE: 72 MMHG | HEART RATE: 64 BPM | HEIGHT: 65.25 IN | SYSTOLIC BLOOD PRESSURE: 106 MMHG

## 2023-02-21 DIAGNOSIS — Z00.00 PHYSICAL EXAM, ANNUAL: Primary | ICD-10-CM

## 2023-02-21 DIAGNOSIS — Z12.11 SCREENING FOR COLON CANCER: ICD-10-CM

## 2023-02-21 DIAGNOSIS — Z00.00 PHYSICAL EXAM, ANNUAL: ICD-10-CM

## 2023-02-21 DIAGNOSIS — Z12.31 ENCOUNTER FOR SCREENING MAMMOGRAM FOR MALIGNANT NEOPLASM OF BREAST: ICD-10-CM

## 2023-02-21 DIAGNOSIS — Z01.419 VISIT FOR PELVIC EXAM: ICD-10-CM

## 2023-02-21 DIAGNOSIS — Z23 NEED FOR VACCINATION: ICD-10-CM

## 2023-02-21 LAB
ALBUMIN SERPL-MCNC: 3.9 G/DL (ref 3.4–5)
ALBUMIN/GLOB SERPL: 1.3 {RATIO} (ref 1–2)
ALP LIVER SERPL-CCNC: 77 U/L
ALT SERPL-CCNC: 35 U/L
ANION GAP SERPL CALC-SCNC: 5 MMOL/L (ref 0–18)
AST SERPL-CCNC: 29 U/L (ref 15–37)
BASOPHILS # BLD AUTO: 0.05 X10(3) UL (ref 0–0.2)
BASOPHILS NFR BLD AUTO: 0.6 %
BILIRUB SERPL-MCNC: 0.5 MG/DL (ref 0.1–2)
BUN BLD-MCNC: 13 MG/DL (ref 7–18)
CALCIUM BLD-MCNC: 9.3 MG/DL (ref 8.5–10.1)
CHLORIDE SERPL-SCNC: 106 MMOL/L (ref 98–112)
CHOLEST SERPL-MCNC: 275 MG/DL (ref ?–200)
CO2 SERPL-SCNC: 27 MMOL/L (ref 21–32)
CREAT BLD-MCNC: 1.01 MG/DL
EOSINOPHIL # BLD AUTO: 0.39 X10(3) UL (ref 0–0.7)
EOSINOPHIL NFR BLD AUTO: 4.7 %
ERYTHROCYTE [DISTWIDTH] IN BLOOD BY AUTOMATED COUNT: 12.5 %
EST. AVERAGE GLUCOSE BLD GHB EST-MCNC: 117 MG/DL (ref 68–126)
FASTING PATIENT LIPID ANSWER: YES
FASTING STATUS PATIENT QL REPORTED: YES
GFR SERPLBLD BASED ON 1.73 SQ M-ARVRAT: 70 ML/MIN/1.73M2 (ref 60–?)
GLOBULIN PLAS-MCNC: 3.1 G/DL (ref 2.8–4.4)
GLUCOSE BLD-MCNC: 94 MG/DL (ref 70–99)
HBA1C MFR BLD: 5.7 % (ref ?–5.7)
HCT VFR BLD AUTO: 41.5 %
HDLC SERPL-MCNC: 67 MG/DL (ref 40–59)
HGB BLD-MCNC: 13.5 G/DL
IMM GRANULOCYTES # BLD AUTO: 0.02 X10(3) UL (ref 0–1)
IMM GRANULOCYTES NFR BLD: 0.2 %
LDLC SERPL CALC-MCNC: 183 MG/DL (ref ?–100)
LYMPHOCYTES # BLD AUTO: 2.22 X10(3) UL (ref 1–4)
LYMPHOCYTES NFR BLD AUTO: 26.7 %
MCH RBC QN AUTO: 27.3 PG (ref 26–34)
MCHC RBC AUTO-ENTMCNC: 32.5 G/DL (ref 31–37)
MCV RBC AUTO: 84 FL
MONOCYTES # BLD AUTO: 0.38 X10(3) UL (ref 0.1–1)
MONOCYTES NFR BLD AUTO: 4.6 %
NEUTROPHILS # BLD AUTO: 5.25 X10 (3) UL (ref 1.5–7.7)
NEUTROPHILS # BLD AUTO: 5.25 X10(3) UL (ref 1.5–7.7)
NEUTROPHILS NFR BLD AUTO: 63.2 %
NONHDLC SERPL-MCNC: 208 MG/DL (ref ?–130)
OSMOLALITY SERPL CALC.SUM OF ELEC: 286 MOSM/KG (ref 275–295)
PLATELET # BLD AUTO: 276 10(3)UL (ref 150–450)
POTASSIUM SERPL-SCNC: 4.5 MMOL/L (ref 3.5–5.1)
PROT SERPL-MCNC: 7 G/DL (ref 6.4–8.2)
RBC # BLD AUTO: 4.94 X10(6)UL
SODIUM SERPL-SCNC: 138 MMOL/L (ref 136–145)
T4 FREE SERPL-MCNC: 0.8 NG/DL (ref 0.8–1.7)
TRIGL SERPL-MCNC: 137 MG/DL (ref 30–149)
TSI SER-ACNC: 4.41 MIU/ML (ref 0.36–3.74)
VLDLC SERPL CALC-MCNC: 28 MG/DL (ref 0–30)
WBC # BLD AUTO: 8.3 X10(3) UL (ref 4–11)

## 2023-02-21 PROCEDURE — 83036 HEMOGLOBIN GLYCOSYLATED A1C: CPT | Performed by: INTERNAL MEDICINE

## 2023-02-21 PROCEDURE — 80050 GENERAL HEALTH PANEL: CPT | Performed by: INTERNAL MEDICINE

## 2023-02-21 PROCEDURE — 80061 LIPID PANEL: CPT | Performed by: INTERNAL MEDICINE

## 2023-02-21 PROCEDURE — 3074F SYST BP LT 130 MM HG: CPT | Performed by: INTERNAL MEDICINE

## 2023-02-21 PROCEDURE — 3078F DIAST BP <80 MM HG: CPT | Performed by: INTERNAL MEDICINE

## 2023-02-21 PROCEDURE — 3008F BODY MASS INDEX DOCD: CPT | Performed by: INTERNAL MEDICINE

## 2023-02-21 PROCEDURE — 99396 PREV VISIT EST AGE 40-64: CPT | Performed by: INTERNAL MEDICINE

## 2023-02-21 PROCEDURE — 90471 IMMUNIZATION ADMIN: CPT | Performed by: INTERNAL MEDICINE

## 2023-02-21 PROCEDURE — 90715 TDAP VACCINE 7 YRS/> IM: CPT | Performed by: INTERNAL MEDICINE

## 2023-02-21 PROCEDURE — 84439 ASSAY OF FREE THYROXINE: CPT | Performed by: INTERNAL MEDICINE

## 2023-02-21 RX ORDER — TRAZODONE HYDROCHLORIDE 50 MG/1
50 TABLET ORAL NIGHTLY PRN
COMMUNITY
Start: 2023-01-26

## 2023-02-21 RX ORDER — SERTRALINE HYDROCHLORIDE 100 MG/1
100 TABLET, FILM COATED ORAL DAILY
COMMUNITY
Start: 2023-01-23

## 2023-02-22 DIAGNOSIS — R79.89 ELEVATED TSH: Primary | ICD-10-CM

## 2023-03-07 LAB — HPV I/H RISK 1 DNA SPEC QL NAA+PROBE: NEGATIVE

## 2023-07-17 ENCOUNTER — PATIENT MESSAGE (OUTPATIENT)
Dept: INTERNAL MEDICINE CLINIC | Facility: CLINIC | Age: 48
End: 2023-07-17

## 2023-07-18 ENCOUNTER — OFFICE VISIT (OUTPATIENT)
Dept: FAMILY MEDICINE CLINIC | Facility: CLINIC | Age: 48
End: 2023-07-18
Payer: COMMERCIAL

## 2023-07-18 ENCOUNTER — LAB ENCOUNTER (OUTPATIENT)
Dept: LAB | Age: 48
End: 2023-07-18
Attending: INTERNAL MEDICINE
Payer: COMMERCIAL

## 2023-07-18 VITALS
BODY MASS INDEX: 27.98 KG/M2 | SYSTOLIC BLOOD PRESSURE: 104 MMHG | OXYGEN SATURATION: 99 % | HEIGHT: 65.25 IN | WEIGHT: 170 LBS | RESPIRATION RATE: 16 BRPM | DIASTOLIC BLOOD PRESSURE: 72 MMHG | TEMPERATURE: 98 F | HEART RATE: 72 BPM

## 2023-07-18 DIAGNOSIS — R39.9 UTI SYMPTOMS: Primary | ICD-10-CM

## 2023-07-18 DIAGNOSIS — R79.89 ELEVATED TSH: ICD-10-CM

## 2023-07-18 LAB
BILIRUBIN: NEGATIVE
GLUCOSE (URINE DIPSTICK): NEGATIVE MG/DL
KETONES (URINE DIPSTICK): NEGATIVE MG/DL
MULTISTIX LOT#: ABNORMAL NUMERIC
NITRITE, URINE: NEGATIVE
PH, URINE: 6 (ref 4.5–8)
SPECIFIC GRAVITY: 1.02 (ref 1–1.03)
TSI SER-ACNC: 2.21 MIU/ML (ref 0.36–3.74)
URINE-COLOR: YELLOW
UROBILINOGEN,SEMI-QN: 0.2 MG/DL (ref 0–1.9)

## 2023-07-18 PROCEDURE — 87186 SC STD MICRODIL/AGAR DIL: CPT | Performed by: NURSE PRACTITIONER

## 2023-07-18 PROCEDURE — 3008F BODY MASS INDEX DOCD: CPT | Performed by: NURSE PRACTITIONER

## 2023-07-18 PROCEDURE — 81003 URINALYSIS AUTO W/O SCOPE: CPT | Performed by: NURSE PRACTITIONER

## 2023-07-18 PROCEDURE — 84443 ASSAY THYROID STIM HORMONE: CPT

## 2023-07-18 PROCEDURE — 87086 URINE CULTURE/COLONY COUNT: CPT | Performed by: NURSE PRACTITIONER

## 2023-07-18 PROCEDURE — 3074F SYST BP LT 130 MM HG: CPT | Performed by: NURSE PRACTITIONER

## 2023-07-18 PROCEDURE — 3078F DIAST BP <80 MM HG: CPT | Performed by: NURSE PRACTITIONER

## 2023-07-18 PROCEDURE — 87077 CULTURE AEROBIC IDENTIFY: CPT | Performed by: NURSE PRACTITIONER

## 2023-07-18 PROCEDURE — 99213 OFFICE O/P EST LOW 20 MIN: CPT | Performed by: NURSE PRACTITIONER

## 2023-07-18 RX ORDER — CLONAZEPAM 0.5 MG/1
TABLET ORAL
COMMUNITY
Start: 2023-03-20 | End: 2023-07-18

## 2023-07-18 RX ORDER — NITROFURANTOIN 25; 75 MG/1; MG/1
100 CAPSULE ORAL 2 TIMES DAILY
Qty: 10 CAPSULE | Refills: 0 | Status: SHIPPED | OUTPATIENT
Start: 2023-07-18 | End: 2023-07-23

## 2023-07-18 NOTE — PATIENT INSTRUCTIONS
1. Take Macrobid as prescribed. 2. Drink plenty of fluids to keep well-hydrated. 3. Ensure that you urinate frequently, emptying your bladder completely each time. 4. Follow up with primary care physician in the next 1-2 weeks. 5. Return to care sooner if symptoms do not improve in the next 2-3 days or you develop fever, flank pain, increased blood in urine, inability to urinate. 6. Take AZO Urinary Relief OTC as directed. Take after meals. Do not take this medication for more than 2 days time. 7. We will notify you by phone of urine culture results in the next few days. At this time we will suggest to either discontinue, change, or continue the medication.

## 2023-08-08 PROBLEM — D12.2 BENIGN NEOPLASM OF ASCENDING COLON: Status: ACTIVE | Noted: 2023-08-08

## 2023-08-08 PROBLEM — Z12.11 SPECIAL SCREENING FOR MALIGNANT NEOPLASM OF COLON: Status: ACTIVE | Noted: 2023-08-08

## 2023-11-06 ENCOUNTER — HOSPITAL ENCOUNTER (OUTPATIENT)
Dept: MAMMOGRAPHY | Age: 48
Discharge: HOME | End: 2023-11-06
Attending: INTERNAL MEDICINE
Payer: COMMERCIAL

## 2023-11-06 DIAGNOSIS — Z12.31 ENCOUNTER FOR SCREENING MAMMOGRAM FOR MALIGNANT NEOPLASM OF BREAST: ICD-10-CM

## 2023-11-06 PROCEDURE — 77063 BREAST TOMOSYNTHESIS BI: CPT | Performed by: INTERNAL MEDICINE

## 2023-11-06 PROCEDURE — 77067 SCR MAMMO BI INCL CAD: CPT | Performed by: INTERNAL MEDICINE

## 2024-04-15 ENCOUNTER — OFFICE VISIT (OUTPATIENT)
Dept: INTERNAL MEDICINE CLINIC | Facility: CLINIC | Age: 49
End: 2024-04-15
Payer: COMMERCIAL

## 2024-04-15 VITALS
HEIGHT: 65.5 IN | WEIGHT: 173.31 LBS | TEMPERATURE: 98 F | DIASTOLIC BLOOD PRESSURE: 72 MMHG | RESPIRATION RATE: 12 BRPM | HEART RATE: 72 BPM | BODY MASS INDEX: 28.53 KG/M2 | SYSTOLIC BLOOD PRESSURE: 110 MMHG

## 2024-04-15 DIAGNOSIS — Z13.29 SCREENING FOR ENDOCRINE, METABOLIC AND IMMUNITY DISORDER: ICD-10-CM

## 2024-04-15 DIAGNOSIS — Z00.00 PHYSICAL EXAM, ANNUAL: Primary | ICD-10-CM

## 2024-04-15 DIAGNOSIS — Z13.228 SCREENING FOR ENDOCRINE, METABOLIC AND IMMUNITY DISORDER: ICD-10-CM

## 2024-04-15 DIAGNOSIS — Z12.31 ENCOUNTER FOR SCREENING MAMMOGRAM FOR MALIGNANT NEOPLASM OF BREAST: ICD-10-CM

## 2024-04-15 DIAGNOSIS — Z13.0 SCREENING FOR ENDOCRINE, METABOLIC AND IMMUNITY DISORDER: ICD-10-CM

## 2024-04-15 RX ORDER — CLONIDINE HYDROCHLORIDE 0.1 MG/1
TABLET ORAL
COMMUNITY
Start: 2024-03-15

## 2024-04-15 NOTE — PROGRESS NOTES
[facility]    CHIEF COMPLAINT:   Chief Complaint   Patient presents with    Routine Physical     PHQ2-2. CSSR-0 Has menses. 2/21/23-normal pap, neg HPV. 11/6/23-mammo. 8/8/23-colon-repeat 5 years         HPI:   Arlin Mckinley is a 48 year old female who presents for a complete physical exam. Symptoms: denies discharge, itching, burning or dysuria.     Pap done 2/2023 negative with negative hpv. Menstruating today.   Mammo done 11/2023.   Colonoscopy done 8/2023, repeat in 5 years. Had adenomas.     Up to date tdap.   Get covid booster at her local pharmacy.     Exercise: walks her dog    Derm: sees derm.     She has depression. Is on meds. Sees psych. Has been fairly well controlled. No si, no hi.       has metastatic rectal cancer and she is the caregiver. Has a lot going on. Has 2 boys, one is driving now so that helps out a bit.   She is a nurse, working part time.     Wt Readings from Last 6 Encounters:   04/15/24 173 lb 4.8 oz (78.6 kg)   07/18/23 170 lb (77.1 kg)   07/18/23 170 lb (77.1 kg)   02/21/23 176 lb 3.2 oz (79.9 kg)   05/24/22 165 lb (74.8 kg)   02/16/22 160 lb (72.6 kg)     Body mass index is 28.4 kg/m².       Current Outpatient Medications   Medication Sig Dispense Refill    cloNIDine 0.1 MG Oral Tab TAKE 1 TABLET BY MOUTH 2 TIMES PER DAY AS NEEDED FOR IRRITABILITY OR ANXIETY      sertraline 100 MG Oral Tab Take 1 tablet (100 mg total) by mouth daily. TAke 2 (200mg) daily      traZODone 50 MG Oral Tab Take 1 tablet (50 mg total) by mouth nightly as needed. AT BEDTIME      hydrOXYzine 50 MG Oral Tab 2 tablets (100 mg total) nightly as needed.      buPROPion HCl ER, XL, 300 MG Oral Tablet 24 Hr Take 1 tablet (300 mg total) by mouth once daily. 30 tablet 1      Past Medical History:    Abdominal pain    Acute pharyngitis    Allergic rhinitis    Anxiety    Costochondritis    Depression    Dysfunctional uterine bleeding    Ear pain    Epigastric pain    Gallstones    Headache(784.0)     History of chicken pox    History of depression    Hypercholesterolemia    Irregular bowel habits    Irregular menses    Lipid screening    LLQ pain    OM (otitis media)    right    Plantar warts    multiple    PMDD (premenstrual dysphoric disorder)    \"PMDD\"    Post partum depression    Seasonal allergies    Sinusitis    URI (upper respiratory infection)    UTI (urinary tract infection)    Verruca plantaris    Wears glasses      Past Surgical History:   Procedure Laterality Date          x2; \"csection pre eclampsia and breech\"    Cholecystectomy  2011 [facility]    Cholecystitis    Oral surgery procedure      wisdom teeth removal    Other surgical history      \"lasar pulse OR R foot 3rd toe-97\"      Family History   Problem Relation Age of Onset    Diabetes Father 55    Hypertension Father     Other (Other) Father         thoracic aneurism    Cancer Mother 71        breast cancer    Hypertension Mother     Other (Other) Mother     Depression Son     Other (Other) Son         eating disorder    Other (Other) Maternal Grandmother         AAA    Heart Attack Maternal Grandfather     Mental Disorder Paternal Grandmother     Cancer Paternal Grandmother         lung cancer, smoker    Depression Paternal Grandmother         and anxiety    Mental Disorder Paternal Grandfather     Stroke Paternal Grandfather     Other (Other) Paternal Grandfather         AAA, DM, alcoholic    Breast Cancer Maternal Aunt 50        50    Depression Other         fam hx      Social History:   Social History     Socioeconomic History    Marital status:    Tobacco Use    Smoking status: Never    Smokeless tobacco: Never   Vaping Use    Vaping status: Never Used   Substance and Sexual Activity    Alcohol use: Yes     Alcohol/week: 0.0 - 1.0 standard drinks of alcohol     Comment: 1-2 only on special occasions    Drug use: Not Currently     Types: Cannabis     Comment: approx 3 times per week    Sexual activity: Yes      Partners: Male     Birth control/protection: Pill   Other Topics Concern    Caffeine Concern Yes     Comment: 1 diet pepsi daily    Exercise Yes     Comment: walk/run 3-5 miles 3 times per week    Seat Belt Yes     Occ: nurse. : yes. Children: yes.   Exercise: walking.  Diet: watches minimally     REVIEW OF SYSTEMS:   GENERAL: feels well otherwise  SKIN: denies any unusual skin lesions  EYES:denies blurred vision or double vision  HEENT: denies nasal congestion, sinus pain or ST  LUNGS: denies shortness of breath with exertion  CARDIOVASCULAR: denies chest pain on exertion  GI: denies abdominal pain,denies heartburn  : denies dysuria, vaginal discharge or itching,periods regular, currently menstruating  MUSCULOSKELETAL: denies back pain  NEURO: denies headaches  PSYCHE: see hpi  HEMATOLOGIC: denies hx of anemia  ENDOCRINE: denies thyroid history  ALL/ASTHMA: denies hx of allergy or asthma    EXAM:   /72 (BP Location: Left arm, Patient Position: Sitting, Cuff Size: adult)   Pulse 72   Temp 97.9 °F (36.6 °C) (Temporal)   Resp 12   Ht 5' 5.5\" (1.664 m)   Wt 173 lb 4.8 oz (78.6 kg)   LMP 04/12/2024 (Exact Date)   Breastfeeding No   BMI 28.40 kg/m²   Body mass index is 28.4 kg/m².   GENERAL: well developed, well nourished,in no apparent distress  SKIN: no rashes,no suspicious lesions  HEENT: atraumatic, normocephalic,ears and throat are clear  EYES:PERRLA, conjunctiva are clear  NECK: supple,no adenopathy,no bruits  CHEST: no chest tenderness  LUNGS: clear to auscultation  CARDIO: nl s1 and s2, RRR without murmur  GI: good BS's,no masses, HSM or tenderness  BREAST: no dominant or suspicious mass, no nipple discharge  GENITAL/URINARY:  deferred. Menstruating today. No symptoms.   MUSCULOSKELETAL: back is not tender,FROM of the back  EXTREMITIES: no cyanosis, clubbing or edema  NEURO: Oriented times three,cranial nerves are intact,motor and sensory are grossly intact    Labs:   Lab Results    Component Value Date/Time    WBC 8.3 02/21/2023 01:29 PM    HGB 13.5 02/21/2023 01:29 PM    .0 02/21/2023 01:29 PM      Lab Results   Component Value Date/Time    GLU 94 02/21/2023 01:29 PM     02/21/2023 01:29 PM    K 4.5 02/21/2023 01:29 PM     02/21/2023 01:29 PM    CO2 27.0 02/21/2023 01:29 PM    CREATSERUM 1.01 02/21/2023 01:29 PM    CA 9.3 02/21/2023 01:29 PM    ALB 3.9 02/21/2023 01:29 PM    TP 7.0 02/21/2023 01:29 PM    ALKPHO 77 02/21/2023 01:29 PM    AST 29 02/21/2023 01:29 PM    ALT 35 02/21/2023 01:29 PM    BILT 0.5 02/21/2023 01:29 PM    TSH 2.210 07/18/2023 11:14 AM    T4F 0.8 02/21/2023 01:29 PM        Lab Results   Component Value Date/Time    CHOLEST 275 (H) 02/21/2023 01:29 PM    HDL 67 (H) 02/21/2023 01:29 PM    TRIG 137 02/21/2023 01:29 PM     (H) 02/21/2023 01:29 PM    NONHDLC 208 (H) 02/21/2023 01:29 PM       Lab Results   Component Value Date/Time    A1C 5.7 (H) 02/21/2023 01:29 PM      Vitamin D:    No results found for: \"VITD\"        ASSESSMENT AND PLAN:   Arlin Mckinley is a 48 year old female who presents for a complete physical exam.   1. Physical exam, annual  Do labs.   Breast exam done.   Pelvic exam deferred.   Other HM discussed.   Immunizations discussed. She declines covid booster.   Continue regular exercise.   - CBC With Differential With Platelet; Future  - Comp Metabolic Panel; Future  - Lipid Panel; Future  - Hemoglobin A1C; Future  - TSH W Reflex To Free T4; Future    2. Encounter for screening mammogram for malignant neoplasm of breast  - [facility] [place] 2D+3D SCREENING BILAT (CPT=77067/19882); Future    3. Screening for endocrine, metabolic and immunity disorder  - Hemoglobin A1C; Future        Return in about 1 year (around 4/15/2025) for physical.      Joelle Quan MD

## 2024-12-06 ENCOUNTER — LAB ENCOUNTER (OUTPATIENT)
Dept: LAB | Age: 49
End: 2024-12-06
Attending: INTERNAL MEDICINE
Payer: COMMERCIAL

## 2024-12-06 DIAGNOSIS — Z13.0 SCREENING FOR ENDOCRINE, METABOLIC AND IMMUNITY DISORDER: ICD-10-CM

## 2024-12-06 DIAGNOSIS — Z13.29 SCREENING FOR ENDOCRINE, METABOLIC AND IMMUNITY DISORDER: ICD-10-CM

## 2024-12-06 DIAGNOSIS — Z00.00 PHYSICAL EXAM, ANNUAL: ICD-10-CM

## 2024-12-06 DIAGNOSIS — Z13.228 SCREENING FOR ENDOCRINE, METABOLIC AND IMMUNITY DISORDER: ICD-10-CM

## 2024-12-06 LAB
ALBUMIN SERPL-MCNC: 4.5 G/DL (ref 3.2–4.8)
ALBUMIN/GLOB SERPL: 2 {RATIO} (ref 1–2)
ALP LIVER SERPL-CCNC: 88 U/L
ALT SERPL-CCNC: 14 U/L
ANION GAP SERPL CALC-SCNC: 7 MMOL/L (ref 0–18)
AST SERPL-CCNC: 21 U/L (ref ?–34)
BASOPHILS # BLD AUTO: 0.07 X10(3) UL (ref 0–0.2)
BASOPHILS NFR BLD AUTO: 0.8 %
BILIRUB SERPL-MCNC: 0.4 MG/DL (ref 0.3–1.2)
BUN BLD-MCNC: 9 MG/DL (ref 9–23)
CALCIUM BLD-MCNC: 9.9 MG/DL (ref 8.7–10.4)
CHLORIDE SERPL-SCNC: 108 MMOL/L (ref 98–112)
CHOLEST SERPL-MCNC: 287 MG/DL (ref ?–200)
CO2 SERPL-SCNC: 26 MMOL/L (ref 21–32)
CREAT BLD-MCNC: 1.01 MG/DL
EGFRCR SERPLBLD CKD-EPI 2021: 70 ML/MIN/1.73M2 (ref 60–?)
EOSINOPHIL # BLD AUTO: 0.29 X10(3) UL (ref 0–0.7)
EOSINOPHIL NFR BLD AUTO: 3.2 %
ERYTHROCYTE [DISTWIDTH] IN BLOOD BY AUTOMATED COUNT: 13.8 %
EST. AVERAGE GLUCOSE BLD GHB EST-MCNC: 114 MG/DL (ref 68–126)
FASTING PATIENT LIPID ANSWER: YES
FASTING STATUS PATIENT QL REPORTED: YES
GLOBULIN PLAS-MCNC: 2.3 G/DL (ref 2–3.5)
GLUCOSE BLD-MCNC: 96 MG/DL (ref 70–99)
HBA1C MFR BLD: 5.6 % (ref ?–5.7)
HCT VFR BLD AUTO: 44.2 %
HDLC SERPL-MCNC: 62 MG/DL (ref 40–59)
HGB BLD-MCNC: 14.4 G/DL
IMM GRANULOCYTES # BLD AUTO: 0.03 X10(3) UL (ref 0–1)
IMM GRANULOCYTES NFR BLD: 0.3 %
LDLC SERPL CALC-MCNC: 194 MG/DL (ref ?–100)
LYMPHOCYTES # BLD AUTO: 2.51 X10(3) UL (ref 1–4)
LYMPHOCYTES NFR BLD AUTO: 27.8 %
MCH RBC QN AUTO: 27.3 PG (ref 26–34)
MCHC RBC AUTO-ENTMCNC: 32.6 G/DL (ref 31–37)
MCV RBC AUTO: 83.9 FL
MONOCYTES # BLD AUTO: 0.35 X10(3) UL (ref 0.1–1)
MONOCYTES NFR BLD AUTO: 3.9 %
NEUTROPHILS # BLD AUTO: 5.79 X10 (3) UL (ref 1.5–7.7)
NEUTROPHILS # BLD AUTO: 5.79 X10(3) UL (ref 1.5–7.7)
NEUTROPHILS NFR BLD AUTO: 64 %
NONHDLC SERPL-MCNC: 225 MG/DL (ref ?–130)
OSMOLALITY SERPL CALC.SUM OF ELEC: 291 MOSM/KG (ref 275–295)
PLATELET # BLD AUTO: 266 10(3)UL (ref 150–450)
POTASSIUM SERPL-SCNC: 4.8 MMOL/L (ref 3.5–5.1)
PROT SERPL-MCNC: 6.8 G/DL (ref 5.7–8.2)
RBC # BLD AUTO: 5.27 X10(6)UL
SODIUM SERPL-SCNC: 141 MMOL/L (ref 136–145)
TRIGL SERPL-MCNC: 169 MG/DL (ref 30–149)
TSI SER-ACNC: 1.59 UIU/ML (ref 0.55–4.78)
VLDLC SERPL CALC-MCNC: 36 MG/DL (ref 0–30)
WBC # BLD AUTO: 9 X10(3) UL (ref 4–11)

## 2024-12-06 PROCEDURE — 85025 COMPLETE CBC W/AUTO DIFF WBC: CPT

## 2024-12-06 PROCEDURE — 84443 ASSAY THYROID STIM HORMONE: CPT

## 2024-12-06 PROCEDURE — 80061 LIPID PANEL: CPT

## 2024-12-06 PROCEDURE — 83036 HEMOGLOBIN GLYCOSYLATED A1C: CPT

## 2024-12-06 PROCEDURE — 36415 COLL VENOUS BLD VENIPUNCTURE: CPT

## 2024-12-06 PROCEDURE — 80053 COMPREHEN METABOLIC PANEL: CPT

## 2024-12-10 ENCOUNTER — TELEPHONE (OUTPATIENT)
Dept: INTERNAL MEDICINE CLINIC | Facility: CLINIC | Age: 49
End: 2024-12-10

## 2024-12-10 ENCOUNTER — ORDER TRANSCRIPTION (OUTPATIENT)
Dept: ADMINISTRATIVE | Facility: HOSPITAL | Age: 49
End: 2024-12-10

## 2024-12-10 DIAGNOSIS — Z13.9 ENCOUNTER FOR SCREENING: Primary | ICD-10-CM

## 2025-01-08 ENCOUNTER — HOSPITAL ENCOUNTER (OUTPATIENT)
Dept: MAMMOGRAPHY | Age: 50
Discharge: HOME | End: 2025-01-08
Attending: INTERNAL MEDICINE
Payer: COMMERCIAL

## 2025-01-08 DIAGNOSIS — Z12.31 ENCOUNTER FOR SCREENING MAMMOGRAM FOR MALIGNANT NEOPLASM OF BREAST: ICD-10-CM

## 2025-01-08 PROCEDURE — 77067 SCR MAMMO BI INCL CAD: CPT | Performed by: INTERNAL MEDICINE

## 2025-01-08 PROCEDURE — 77063 BREAST TOMOSYNTHESIS BI: CPT | Performed by: INTERNAL MEDICINE

## 2025-01-09 ENCOUNTER — HOSPITAL ENCOUNTER (OUTPATIENT)
Dept: CT IMAGING | Age: 50
End: 2025-01-09
Attending: INTERNAL MEDICINE

## 2025-01-29 ENCOUNTER — HOSPITAL ENCOUNTER (OUTPATIENT)
Dept: MAMMOGRAPHY | Facility: HOSPITAL | Age: 50
Discharge: HOME | End: 2025-01-29
Attending: INTERNAL MEDICINE
Payer: COMMERCIAL

## 2025-01-29 DIAGNOSIS — R92.2 INCONCLUSIVE MAMMOGRAM: ICD-10-CM

## 2025-01-29 PROCEDURE — 77061 BREAST TOMOSYNTHESIS UNI: CPT | Performed by: INTERNAL MEDICINE

## 2025-01-29 PROCEDURE — 76642 ULTRASOUND BREAST LIMITED: CPT | Performed by: INTERNAL MEDICINE

## 2025-01-29 PROCEDURE — 77065 DX MAMMO INCL CAD UNI: CPT | Performed by: INTERNAL MEDICINE

## 2025-01-31 DIAGNOSIS — R92.8 ABNORMAL MAMMOGRAM: Primary | ICD-10-CM

## 2025-07-02 ENCOUNTER — OFFICE VISIT (OUTPATIENT)
Dept: FAMILY MEDICINE CLINIC | Facility: CLINIC | Age: 50
End: 2025-07-02
Payer: COMMERCIAL

## 2025-07-02 VITALS
OXYGEN SATURATION: 98 % | RESPIRATION RATE: 18 BRPM | DIASTOLIC BLOOD PRESSURE: 86 MMHG | WEIGHT: 163.81 LBS | TEMPERATURE: 98 F | BODY MASS INDEX: 27.29 KG/M2 | HEIGHT: 65 IN | HEART RATE: 85 BPM | SYSTOLIC BLOOD PRESSURE: 116 MMHG

## 2025-07-02 DIAGNOSIS — R05.1 ACUTE COUGH: Primary | ICD-10-CM

## 2025-07-02 DIAGNOSIS — J32.9 VIRAL SINUSITIS: ICD-10-CM

## 2025-07-02 DIAGNOSIS — B97.89 VIRAL SINUSITIS: ICD-10-CM

## 2025-07-02 DIAGNOSIS — J40 BRONCHITIS: ICD-10-CM

## 2025-07-02 LAB
OPERATOR ID: NORMAL
POCT LOT NUMBER: NORMAL
RAPID SARS-COV-2 BY PCR: NOT DETECTED

## 2025-07-02 PROCEDURE — U0002 COVID-19 LAB TEST NON-CDC: HCPCS | Performed by: NURSE PRACTITIONER

## 2025-07-02 PROCEDURE — 99213 OFFICE O/P EST LOW 20 MIN: CPT | Performed by: NURSE PRACTITIONER

## 2025-07-02 RX ORDER — PREDNISONE 20 MG/1
40 TABLET ORAL DAILY
Qty: 10 TABLET | Refills: 0 | Status: SHIPPED | OUTPATIENT
Start: 2025-07-02 | End: 2025-07-07

## 2025-07-02 RX ORDER — DEXTROAMPHETAMINE SACCHARATE, AMPHETAMINE ASPARTATE, DEXTROAMPHETAMINE SULFATE AND AMPHETAMINE SULFATE 2.5; 2.5; 2.5; 2.5 MG/1; MG/1; MG/1; MG/1
TABLET ORAL
COMMUNITY
Start: 2025-05-30

## 2025-07-02 RX ORDER — PREDNISONE 20 MG/1
40 TABLET ORAL DAILY
Qty: 10 TABLET | Refills: 0 | Status: CANCELLED | OUTPATIENT
Start: 2025-07-02 | End: 2025-07-07

## 2025-07-02 RX ORDER — ALBUTEROL SULFATE 90 UG/1
2 INHALANT RESPIRATORY (INHALATION) EVERY 4 HOURS PRN
Qty: 1 EACH | Refills: 0 | Status: SHIPPED | OUTPATIENT
Start: 2025-07-02

## 2025-07-02 NOTE — PROGRESS NOTES
CHIEF COMPLAINT:     Chief Complaint   Patient presents with    Cough     Cough, shortness of breath on exertion, sinus pressure, congestion, mainly just chest congestion and cough, started last week, has taken nyquil, nasal spray, rubatussin, and dayquil.        HPI:   Arlin Mckinley is a 49 year old female who presents for upper respiratory symptoms for  1 weeks. Patient reports congestion, dry cough, occasionally wet as well with clear-dean sputum. Reports chest has been feeling congested past few days and had some SOB with exertions yesterday at work.  Denies wheezing, sob at rest. No chest pain. Mild sinus pressure. Symptoms have been slowly worsening since onset.  Treating symptoms with otc cold meds.   Associated symptoms include some fatigue. No fevers, chills, aches. Son with similar symptoms last week, but he improved quickly.    Current Medications[1]   Past Medical History[2]   Past Surgical History[3]      Short Social Hx on File[4]      REVIEW OF SYSTEMS:   GENERAL: feels well otherwise,   ok appetite  SKIN: no rashes or abnormal skin lesions  HEENT: See HPI  LUNGS: denies shortness of breath or wheezing, See HPI  CARDIOVASCULAR: denies chest pain or palpitations   GI: denies N/V/C or abdominal pain  NEURO: Denies headaches    EXAM:   /86 (BP Location: Left arm, Patient Position: Sitting)   Pulse 85   Temp 98.3 °F (36.8 °C) (Oral)   Resp 18   Ht 5' 5\" (1.651 m)   Wt 163 lb 12.8 oz (74.3 kg)   LMP 12/24/2024 (Exact Date)   SpO2 98%   BMI 27.26 kg/m²   GENERAL: well developed, well nourished,in no apparent distress  SKIN: no rashes,no suspicious lesions  HEAD: atraumatic, normocephalic.  No tenderness on palpation of maxillary or frontal sinuses  EYES: conjunctiva clear, EOM intact  EARS: TM's pearly, no  bulging, no retraction,no  fluid, bony landmarks visualized  NOSE: Nostrils patent, mucoid nasal discharge, nasal mucosa pink and moist  THROAT: Oral mucosa pink, moist. Posterior pharynx is  not erythematous. no exudates. Tonsils 1/4.    NECK: Supple, non-tender  LUNGS: clear to auscultation bilaterally, no wheezes or rhonchi.  No crackles/rales, good air movement throughout. Breathing is non labored. Wet cough with deep breathings  CARDIO: RRR without murmur  EXTREMITIES: no cyanosis, clubbing or edema  LYMPH:  No cervical lymphadenopathy.        ASSESSMENT AND PLAN:   Arlin Mckinley is a 49 year old female who presents with     ASSESSMENT:   Encounter Diagnoses   Name Primary?    Acute cough Yes    Bronchitis     Viral sinusitis        PLAN: Meds as below.  Mucinex to help thin secretions.    Follow up with PCP if no improvement in 3-5 days, sooner if worsening.  If any sob/wheezing seek emergent care.Comfort care as described in Patient Instructions    Meds & Refills for this Visit:  Requested Prescriptions     Signed Prescriptions Disp Refills    albuterol (PROAIR HFA) 108 (90 Base) MCG/ACT Inhalation Aero Soln 1 each 0     Sig: Inhale 2 puffs into the lungs every 4 (four) hours as needed.    predniSONE 20 MG Oral Tab 10 tablet 0     Sig: Take 2 tablets (40 mg total) by mouth daily for 5 days.       Risks, benefits, and side effects of medication explained and discussed.    There are no Patient Instructions on file for this visit.    The patient indicates understanding of these issues and agrees to the plan.  The patient is asked to return if sx's persist or worsen.            [1]   Current Outpatient Medications   Medication Sig Dispense Refill    amphetamine-dextroamphetamine 10 MG Oral Tab Take 1 tablet by mouth one time. Collaborating physician Dr. Yuri Muñoz -- fill on or after 5-1-25      albuterol (PROAIR HFA) 108 (90 Base) MCG/ACT Inhalation Aero Soln Inhale 2 puffs into the lungs every 4 (four) hours as needed. 1 each 0    predniSONE 20 MG Oral Tab Take 2 tablets (40 mg total) by mouth daily for 5 days. 10 tablet 0    cloNIDine 0.1 MG Oral Tab TAKE 1 TABLET BY MOUTH 2 TIMES PER DAY AS NEEDED  FOR IRRITABILITY OR ANXIETY      sertraline 100 MG Oral Tab Take 1 tablet (100 mg total) by mouth daily. TAke 2 (200mg) daily      traZODone 50 MG Oral Tab Take 1 tablet (50 mg total) by mouth nightly as needed. AT BEDTIME      hydrOXYzine 50 MG Oral Tab 2 tablets (100 mg total) nightly as needed.      buPROPion HCl ER, XL, 300 MG Oral Tablet 24 Hr Take 1 tablet (300 mg total) by mouth once daily. 30 tablet 1   [2]   Past Medical History:   Abdominal pain    Acute pharyngitis    Allergic rhinitis    Anxiety    Costochondritis    Depression    Dysfunctional uterine bleeding    Ear pain    Epigastric pain    Gallstones    Headache(784.0)    History of chicken pox    History of depression    Hypercholesterolemia    Irregular bowel habits    Irregular menses    Lipid screening    LLQ pain    OM (otitis media)    right    Plantar warts    multiple    PMDD (premenstrual dysphoric disorder)    \"PMDD\"    Post partum depression    Seasonal allergies    Sinusitis    URI (upper respiratory infection)    UTI (urinary tract infection)    Verruca plantaris    Wears glasses   [3]   Past Surgical History:  Procedure Laterality Date          x2; \"csection pre eclampsia and breech\"    Cholecystectomy  2011 [facility]    Cholecystitis    Oral surgery procedure      wisdom teeth removal    Other surgical history      \"lasar pulse OR R foot 3rd toe-97\"   [4]   Social History  Socioeconomic History    Marital status:    Tobacco Use    Smoking status: Never    Smokeless tobacco: Never   Vaping Use    Vaping status: Never Used   Substance and Sexual Activity    Alcohol use: Yes     Alcohol/week: 0.0 - 1.0 standard drinks of alcohol     Comment: 1-2 only on special occasions    Drug use: Not Currently     Types: Cannabis     Comment: approx 3 times per week    Sexual activity: Yes     Partners: Male     Birth control/protection: Pill   Other Topics Concern    Caffeine Concern Yes     Comment: 1 diet pepsi daily     Exercise Yes     Comment: walk/run 3-5 miles 3 times per week    Seat Belt Yes

## 2025-07-31 ENCOUNTER — HOSPITAL ENCOUNTER (OUTPATIENT)
Dept: MAMMOGRAPHY | Facility: HOSPITAL | Age: 50
Discharge: HOME | End: 2025-07-31
Attending: INTERNAL MEDICINE

## 2025-07-31 DIAGNOSIS — R92.8 ABNORMAL MAMMOGRAM: ICD-10-CM

## 2025-07-31 PROCEDURE — 77061 BREAST TOMOSYNTHESIS UNI: CPT | Performed by: INTERNAL MEDICINE

## 2025-07-31 PROCEDURE — 77065 DX MAMMO INCL CAD UNI: CPT | Performed by: INTERNAL MEDICINE

## (undated) DIAGNOSIS — R53.83 FATIGUE, UNSPECIFIED TYPE: Primary | ICD-10-CM

## (undated) DIAGNOSIS — F32.A DEPRESSION, UNSPECIFIED DEPRESSION TYPE: ICD-10-CM

## (undated) DIAGNOSIS — F41.9 ANXIETY: ICD-10-CM

## (undated) NOTE — MR AVS SNAPSHOT
[address] [id number] [phone]               Thank you for choosing us for your health care visit with Hank Guardado MD.  We are glad to serve you and happy to provid - BuPROPion HCl ER (XL) 300 MG Tb24            MyChart     Call the [employer] for assistance with your inactive [facility] account    If you have questions, you can call [phone] to talk to our [facility] Staff.  Remember, MyChart is NOT to be used